# Patient Record
Sex: MALE | Race: WHITE | NOT HISPANIC OR LATINO | Employment: OTHER | URBAN - METROPOLITAN AREA
[De-identification: names, ages, dates, MRNs, and addresses within clinical notes are randomized per-mention and may not be internally consistent; named-entity substitution may affect disease eponyms.]

---

## 2022-11-17 ENCOUNTER — TRANSFERRED RECORDS (OUTPATIENT)
Dept: HEALTH INFORMATION MANAGEMENT | Facility: CLINIC | Age: 77
End: 2022-11-17

## 2023-05-15 ENCOUNTER — HOSPITAL ENCOUNTER (EMERGENCY)
Facility: CLINIC | Age: 78
Discharge: HOME | End: 2023-05-15
Attending: EMERGENCY MEDICINE | Admitting: EMERGENCY MEDICINE
Payer: MEDICARE

## 2023-05-15 VITALS
OXYGEN SATURATION: 99 % | RESPIRATION RATE: 18 BRPM | DIASTOLIC BLOOD PRESSURE: 97 MMHG | SYSTOLIC BLOOD PRESSURE: 162 MMHG | TEMPERATURE: 98.2 F | HEART RATE: 62 BPM

## 2023-05-15 DIAGNOSIS — I10 ESSENTIAL HYPERTENSION: ICD-10-CM

## 2023-05-15 LAB
ANION GAP SERPL CALCULATED.3IONS-SCNC: 8 MMOL/L (ref 7–15)
BASOPHILS # BLD AUTO: 0.1 10E3/UL (ref 0–0.2)
BASOPHILS NFR BLD AUTO: 2 %
BUN SERPL-MCNC: 17.6 MG/DL (ref 8–23)
CALCIUM SERPL-MCNC: 10.1 MG/DL (ref 8.8–10.2)
CHLORIDE SERPL-SCNC: 107 MMOL/L (ref 98–107)
CREAT SERPL-MCNC: 1.06 MG/DL (ref 0.67–1.17)
DEPRECATED HCO3 PLAS-SCNC: 28 MMOL/L (ref 22–29)
EOSINOPHIL # BLD AUTO: 0.3 10E3/UL (ref 0–0.7)
EOSINOPHIL NFR BLD AUTO: 4 %
ERYTHROCYTE [DISTWIDTH] IN BLOOD BY AUTOMATED COUNT: 13.3 % (ref 10–15)
GFR SERPL CREATININE-BSD FRML MDRD: 73 ML/MIN/1.73M2
GLUCOSE SERPL-MCNC: 90 MG/DL (ref 70–99)
HCT VFR BLD AUTO: 46.7 % (ref 40–53)
HGB BLD-MCNC: 15.4 G/DL (ref 13.3–17.7)
HOLD SPECIMEN: NORMAL
HOLD SPECIMEN: NORMAL
IMM GRANULOCYTES # BLD: 0 10E3/UL
IMM GRANULOCYTES NFR BLD: 0 %
LYMPHOCYTES # BLD AUTO: 2.8 10E3/UL (ref 0.8–5.3)
LYMPHOCYTES NFR BLD AUTO: 42 %
MCH RBC QN AUTO: 31.8 PG (ref 26.5–33)
MCHC RBC AUTO-ENTMCNC: 33 G/DL (ref 31.5–36.5)
MCV RBC AUTO: 96 FL (ref 78–100)
MONOCYTES # BLD AUTO: 0.7 10E3/UL (ref 0–1.3)
MONOCYTES NFR BLD AUTO: 10 %
NEUTROPHILS # BLD AUTO: 2.8 10E3/UL (ref 1.6–8.3)
NEUTROPHILS NFR BLD AUTO: 42 %
NRBC # BLD AUTO: 0 10E3/UL
NRBC BLD AUTO-RTO: 0 /100
PLATELET # BLD AUTO: 260 10E3/UL (ref 150–450)
POTASSIUM SERPL-SCNC: 4.7 MMOL/L (ref 3.4–5.3)
RBC # BLD AUTO: 4.85 10E6/UL (ref 4.4–5.9)
SODIUM SERPL-SCNC: 143 MMOL/L (ref 136–145)
TROPONIN T SERPL HS-MCNC: 15 NG/L
WBC # BLD AUTO: 6.6 10E3/UL (ref 4–11)

## 2023-05-15 PROCEDURE — 99284 EMERGENCY DEPT VISIT MOD MDM: CPT

## 2023-05-15 PROCEDURE — 36415 COLL VENOUS BLD VENIPUNCTURE: CPT | Performed by: EMERGENCY MEDICINE

## 2023-05-15 PROCEDURE — 85025 COMPLETE CBC W/AUTO DIFF WBC: CPT | Performed by: EMERGENCY MEDICINE

## 2023-05-15 PROCEDURE — 84484 ASSAY OF TROPONIN QUANT: CPT | Performed by: EMERGENCY MEDICINE

## 2023-05-15 PROCEDURE — 80048 BASIC METABOLIC PNL TOTAL CA: CPT | Performed by: EMERGENCY MEDICINE

## 2023-05-15 PROCEDURE — 93005 ELECTROCARDIOGRAM TRACING: CPT

## 2023-05-15 PROCEDURE — 250N000013 HC RX MED GY IP 250 OP 250 PS 637: Performed by: EMERGENCY MEDICINE

## 2023-05-15 RX ORDER — LISINOPRIL/HYDROCHLOROTHIAZIDE 10-12.5 MG
1 TABLET ORAL DAILY
Qty: 30 TABLET | Refills: 0 | Status: SHIPPED | OUTPATIENT
Start: 2023-05-15 | End: 2023-06-14

## 2023-05-15 RX ORDER — LISINOPRIL/HYDROCHLOROTHIAZIDE 10-12.5 MG
1 TABLET ORAL ONCE
Status: COMPLETED | OUTPATIENT
Start: 2023-05-15 | End: 2023-05-15

## 2023-05-15 RX ORDER — LISINOPRIL/HYDROCHLOROTHIAZIDE 10-12.5 MG
1 TABLET ORAL DAILY
Status: DISCONTINUED | OUTPATIENT
Start: 2023-05-15 | End: 2023-05-15

## 2023-05-15 RX ADMIN — LISINOPRIL AND HYDROCHLOROTHIAZIDE 1 TABLET: 12.5; 1 TABLET ORAL at 17:51

## 2023-05-15 ASSESSMENT — ENCOUNTER SYMPTOMS
BACK PAIN: 0
PALPITATIONS: 0
WEAKNESS: 0
SHORTNESS OF BREATH: 0
LIGHT-HEADEDNESS: 1
CHILLS: 0
NAUSEA: 0
FEVER: 0
HEADACHES: 0
VOMITING: 0

## 2023-05-15 ASSESSMENT — ACTIVITIES OF DAILY LIVING (ADL): ADLS_ACUITY_SCORE: 35

## 2023-05-15 NOTE — ED PROVIDER NOTES
History     Chief Complaint:  Hypertension       The history is provided by the patient.      Kt Tillman is a 77 year old male with a history of hyperlipidemia who presents with hypertension, light-headedness. The patient states that over the last 3 weeks he noticed his blood pressure increasing, went to urgent care today and it was 197/104, here in ED it was 204/117. He complains of some accompanied intermittent light-headedness. He denies any recent medications changes, history of hypertension. He mentions that he has been taking 1 Ibuprofen every night for a while to decrease the swelling in his ankles. He denies any chest pain, headache, shortness of breath, nausea, vomiting, fever, chills, back pain, weakness, palpitations.    Independent Historian:   None - Patient Only    Review of External Notes: See MDM     ROS:  Review of Systems   Constitutional: Negative for chills and fever.   Respiratory: Negative for shortness of breath.    Cardiovascular: Negative for chest pain and palpitations.   Gastrointestinal: Negative for nausea and vomiting.   Musculoskeletal: Negative for back pain.   Neurological: Positive for light-headedness. Negative for weakness and headaches.   All other systems reviewed and are negative.      Allergies:  Ciprofloxacin     Medications:    Finasteride   Omeprazole   Lipitor   Zocor     Past Medical History:    GERD  High cholesterol   Thoracic aortic aneurysm   Sena's esophagus   Hyperlipidemia     Past Surgical History:    Spleenectomy    Cholecystectomy   EGD  Bilateral knee scoped      Family History:    Liver caner    Social History:   reports that he has never smoked. He does not have any smokeless tobacco history on file. He reports current alcohol use. He reports that he does not use drugs.  PCP: Vickey Parker     Physical Exam     Patient Vitals for the past 24 hrs:   BP Temp Temp src Pulse Resp SpO2   05/15/23 1423 (!) 204/117 98.2  F (36.8  C) Temporal 62 20 99  %        Physical Exam  Constitutional: Well developed, nontox appearance  Head: Atraumatic.   Mouth/Throat: Oropharynx is clear and moist.   Neck:  no stridor  Eyes: no scleral icterus  Cardiovascular: RRR, 2+ bilat radial pulses  Pulmonary/Chest: nml resp effort, Clear BS bilat  Abdominal: ND, soft, NT, no rebound or guarding   Ext: Warm, well perfused, trace bilateral lower extremity edema  Neurological: A&O, symmetric facies, moves ext x4  Skin: Skin is warm and dry.   Psychiatric: Behavior is normal. Thought content normal.   Nursing note and vitals reviewed.       Emergency Department Course     ECG results from 05/15/23   EKG 12 lead     Value    Systolic Blood Pressure     Diastolic Blood Pressure     Ventricular Rate 55    Atrial Rate 55    CA Interval 244    QRS Duration 108        QTc 419    P Axis 33    R AXIS -23    T Axis 10    Interpretation ECG      Sinus bradycardia with sinus arrhythmia with 1st degree A-V block  Minimal voltage criteria for LVH, may be normal variant  Cannot rule out Inferior infarct , age undetermined  Abnormal ECG  When compared with ECG of 13-MAY-2011 10:56,  CA interval has increased  Vent. rate has decreased BY  32 BPM  T wave amplitude has increased in Anterior leads       Laboratory:  Labs Ordered and Resulted from Time of ED Arrival to Time of ED Departure   BASIC METABOLIC PANEL - Normal       Result Value    Sodium 143      Potassium 4.7      Chloride 107      Carbon Dioxide (CO2) 28      Anion Gap 8      Urea Nitrogen 17.6      Creatinine 1.06      Calcium 10.1      Glucose 90      GFR Estimate 73     TROPONIN T, HIGH SENSITIVITY - Normal    Troponin T, High Sensitivity 15     CBC WITH PLATELETS AND DIFFERENTIAL    WBC Count 6.6      RBC Count 4.85      Hemoglobin 15.4      Hematocrit 46.7      MCV 96      MCH 31.8      MCHC 33.0      RDW 13.3      Platelet Count 260      % Neutrophils 42      % Lymphocytes 42      % Monocytes 10      % Eosinophils 4      %  Basophils 2      % Immature Granulocytes 0      NRBCs per 100 WBC 0      Absolute Neutrophils 2.8      Absolute Lymphocytes 2.8      Absolute Monocytes 0.7      Absolute Eosinophils 0.3      Absolute Basophils 0.1      Absolute Immature Granulocytes 0.0      Absolute NRBCs 0.0        Emergency Department Course & Assessments:       Interventions:  Medications   lisinopril-hydrochlorothiazide (ZESTORETIC) 10-12.5 MG per tablet 1 tablet (has no administration in time range)        Assessments:   I consulted with the patient and obtained history as shown above     Independent Interpretation (X-rays, CTs, rhythm strip):  See MDM    Consultations/Discussion of Management or Tests:  None     Social Determinants of Health affecting care:   See MDM    Disposition:  The patient was discharged to home.     Impression & Plan    CMS Diagnoses: None    Medical Decision Makin year old male presenting w/ hypertension    Social determinants affecting patient's health include:  Age potentially increasing risk for recurrent ED visits, mild alcohol use otherwise no significant social determinants negatively affecting the patient's health     I reviewed medical records from  urgent care office visit from the [facility] on 5/15/2023    DDx includes essential hypertension, hypertension NOS, electrolyte abnormality.  Doubt dissection, CVA given history and physical exam.  Labs significant for no remarkable abnormality.  Given the patient's exam is reassuring, do not feel emergent imaging is indicated at [facility].  I reviewed his most recent imaging noting mild ascending and descending aortic aneurysms with no evidence of dissection.  He has no symptoms of dissection as well and denies chest pain, shortness of breath, abdominal pain, new or acute back pain.  I think would be reasonable to start the patient on antihypertensive as prescribed as noted below with recommendations to follow-up with PCP and to monitor blood  pressures at home recording the results to go over with his primary care physician.  At this time I feel the pt is safe for discharge.  Recommendations given regarding follow up with PCP and return to the emergency department as needed for new or worsening symptoms.  Pt and wife counseled on all results, disposition and diagnosis.  They are understanding and agreeable to plan. Patient discharged in stable condition.      Diagnosis:    ICD-10-CM    1. Essential hypertension  I10            Discharge Medications:  New Prescriptions    LISINOPRIL-HYDROCHLOROTHIAZIDE (ZESTORETIC) 10-12.5 MG TABLET    Take 1 tablet by mouth daily for 30 days      Scribe Disclosure:  I, Antoine Swan, am serving as a scribe at 4:51 PM on 5/15/2023 to document services personally performed by Joey Galo MD based on my observations and the provider's statements to me.     5/15/2023   Joey Galo MD Vaughn, Christopher E, MD  05/15/23 1646

## 2023-05-15 NOTE — ED TRIAGE NOTES
Patient having dizzy spells x3 weeks ago. Did a home blood pressure and noted it was higher than normal. Went to  and it was 197/103. No hx of hypertension. Hx of descending aortic aneurysm

## 2023-05-15 NOTE — DISCHARGE INSTRUCTIONS
Discharge Instructions  Hypertension - High Blood Pressure    During you visit to the Emergency Department, your blood pressure was higher than the recommended blood pressure.  This may be related to stress, pain, medication or other temporary conditions. In these cases, your blood pressure may return to normal on its own. If you have a history of high blood pressure, you may need to have your provider adjust your medications. Sometimes, your high measurement here may indicate that you have developed high blood pressure that will stay high unless it is treated. As a general rule, high blood pressure causes problems over years rather than days, weeks, or months. So, while it is important to treat blood pressure, it is rarely important to treat blood pressure immediately. Occasionally we will begin a medication in the Emergency Department; more often we will recommend close follow-up for medications with a primary doctor/clinic.    Generally, every Emergency Department visit should have a follow-up clinic visit with either a primary or a specialty clinic/provider. Please follow-up as instructed by your emergency provider today.    Return to the Emergency Department if you start to have:  A severe headache.  Chest pain.  Shortness of breath.  Weakness or numbness that affects one part of the body.  Confusion.  Vision changes.  Significant swelling of legs and/or eyes.  A reaction to any medication started in the Emergency Department.    What can I do to help myself?  Avoid alcohol.  Take any blood pressure medicine that you are prescribed.  Get a good night s sleep.  Lower your salt intake.  Exercise.  Lose weight.  Manage stress.  See your doctor regularly    If blood pressure medication was started in the Emergency Department:  The medicine may not have an immediate effect. The body and brain determine what blood pressure you have. The medicine s job is to retrain the body s  thermostat  to a lower blood  pressure.  You will need to follow up with your provider to see how this medicine is working for you.  If you were given a prescription for medicine here today, be sure to read all of the information (including the package insert) that comes with your prescription.  This will include important information about the medicine, its side effects, and any warnings that you need to know about.  The pharmacist who fills the prescription can provide more information and answer questions you may have about the medicine.  If you have questions or concerns that the pharmacist cannot address, please call or return to the Emergency Department.   Remember that you can always come back to the Emergency Department if you are not able to see your regular provider in the amount of time listed above, if you get any new symptoms, or if there is anything that worries you.

## 2023-05-16 LAB
ATRIAL RATE - MUSE: 55 BPM
DIASTOLIC BLOOD PRESSURE - MUSE: NORMAL MMHG
INTERPRETATION ECG - MUSE: NORMAL
P AXIS - MUSE: 33 DEGREES
PR INTERVAL - MUSE: 244 MS
QRS DURATION - MUSE: 108 MS
QT - MUSE: 438 MS
QTC - MUSE: 419 MS
R AXIS - MUSE: -23 DEGREES
SYSTOLIC BLOOD PRESSURE - MUSE: NORMAL MMHG
T AXIS - MUSE: 10 DEGREES
VENTRICULAR RATE- MUSE: 55 BPM

## 2024-08-22 ENCOUNTER — APPOINTMENT (OUTPATIENT)
Dept: ULTRASOUND IMAGING | Facility: CLINIC | Age: 79
End: 2024-08-22
Payer: MEDICARE

## 2024-08-22 ENCOUNTER — HOSPITAL ENCOUNTER (EMERGENCY)
Facility: CLINIC | Age: 79
Discharge: HOME | End: 2024-08-22
Attending: EMERGENCY MEDICINE | Admitting: EMERGENCY MEDICINE
Payer: MEDICARE

## 2024-08-22 ENCOUNTER — APPOINTMENT (OUTPATIENT)
Dept: CT IMAGING | Facility: CLINIC | Age: 79
End: 2024-08-22
Payer: MEDICARE

## 2024-08-22 VITALS
HEIGHT: 73 IN | HEART RATE: 84 BPM | BODY MASS INDEX: 28.23 KG/M2 | OXYGEN SATURATION: 95 % | WEIGHT: 213 LBS | RESPIRATION RATE: 15 BRPM | TEMPERATURE: 99 F | DIASTOLIC BLOOD PRESSURE: 82 MMHG | SYSTOLIC BLOOD PRESSURE: 136 MMHG

## 2024-08-22 DIAGNOSIS — R55 NEAR SYNCOPE: ICD-10-CM

## 2024-08-22 DIAGNOSIS — Z98.890 POSTOPERATIVE STATE: ICD-10-CM

## 2024-08-22 LAB
ANION GAP SERPL CALCULATED.3IONS-SCNC: 10 MMOL/L (ref 7–15)
ATRIAL RATE - MUSE: 89 BPM
BASOPHILS # BLD AUTO: 0.1 10E3/UL (ref 0–0.2)
BASOPHILS NFR BLD AUTO: 1 %
BUN SERPL-MCNC: 17.5 MG/DL (ref 8–23)
CALCIUM SERPL-MCNC: 9 MG/DL (ref 8.8–10.4)
CHLORIDE SERPL-SCNC: 106 MMOL/L (ref 98–107)
CREAT SERPL-MCNC: 1.18 MG/DL (ref 0.67–1.17)
DIASTOLIC BLOOD PRESSURE - MUSE: NORMAL MMHG
EGFRCR SERPLBLD CKD-EPI 2021: 63 ML/MIN/1.73M2
EOSINOPHIL # BLD AUTO: 0.3 10E3/UL (ref 0–0.7)
EOSINOPHIL NFR BLD AUTO: 3 %
ERYTHROCYTE [DISTWIDTH] IN BLOOD BY AUTOMATED COUNT: 14.1 % (ref 10–15)
GLUCOSE SERPL-MCNC: 103 MG/DL (ref 70–99)
HCO3 SERPL-SCNC: 24 MMOL/L (ref 22–29)
HCT VFR BLD AUTO: 35.3 % (ref 40–53)
HGB BLD-MCNC: 11.7 G/DL (ref 13.3–17.7)
HOLD SPECIMEN: NORMAL
IMM GRANULOCYTES # BLD: 0 10E3/UL
IMM GRANULOCYTES NFR BLD: 0 %
INTERPRETATION ECG - MUSE: NORMAL
LYMPHOCYTES # BLD AUTO: 1.9 10E3/UL (ref 0.8–5.3)
LYMPHOCYTES NFR BLD AUTO: 20 %
MCH RBC QN AUTO: 31.3 PG (ref 26.5–33)
MCHC RBC AUTO-ENTMCNC: 33.1 G/DL (ref 31.5–36.5)
MCV RBC AUTO: 94 FL (ref 78–100)
MONOCYTES # BLD AUTO: 1.3 10E3/UL (ref 0–1.3)
MONOCYTES NFR BLD AUTO: 13 %
NEUTROPHILS # BLD AUTO: 6 10E3/UL (ref 1.6–8.3)
NEUTROPHILS NFR BLD AUTO: 62 %
NRBC # BLD AUTO: 0 10E3/UL
NRBC BLD AUTO-RTO: 0 /100
P AXIS - MUSE: 34 DEGREES
PLATELET # BLD AUTO: 209 10E3/UL (ref 150–450)
POTASSIUM SERPL-SCNC: 3.9 MMOL/L (ref 3.4–5.3)
PR INTERVAL - MUSE: 180 MS
QRS DURATION - MUSE: 98 MS
QT - MUSE: 370 MS
QTC - MUSE: 450 MS
R AXIS - MUSE: -12 DEGREES
RBC # BLD AUTO: 3.74 10E6/UL (ref 4.4–5.9)
SODIUM SERPL-SCNC: 140 MMOL/L (ref 135–145)
SYSTOLIC BLOOD PRESSURE - MUSE: NORMAL MMHG
T AXIS - MUSE: 85 DEGREES
VENTRICULAR RATE- MUSE: 89 BPM
WBC # BLD AUTO: 9.7 10E3/UL (ref 4–11)

## 2024-08-22 PROCEDURE — 36415 COLL VENOUS BLD VENIPUNCTURE: CPT | Performed by: EMERGENCY MEDICINE

## 2024-08-22 PROCEDURE — 82374 ASSAY BLOOD CARBON DIOXIDE: CPT

## 2024-08-22 PROCEDURE — 71275 CT ANGIOGRAPHY CHEST: CPT | Mod: MF

## 2024-08-22 PROCEDURE — 99285 EMERGENCY DEPT VISIT HI MDM: CPT | Mod: 25

## 2024-08-22 PROCEDURE — 250N000011 HC RX IP 250 OP 636

## 2024-08-22 PROCEDURE — 250N000009 HC RX 250

## 2024-08-22 PROCEDURE — 250N000013 HC RX MED GY IP 250 OP 250 PS 637

## 2024-08-22 PROCEDURE — 85025 COMPLETE CBC W/AUTO DIFF WBC: CPT

## 2024-08-22 PROCEDURE — 93005 ELECTROCARDIOGRAM TRACING: CPT

## 2024-08-22 PROCEDURE — 93971 EXTREMITY STUDY: CPT | Mod: LT

## 2024-08-22 RX ORDER — ACETAMINOPHEN 325 MG/1
975 TABLET ORAL ONCE
Status: COMPLETED | OUTPATIENT
Start: 2024-08-22 | End: 2024-08-22

## 2024-08-22 RX ORDER — IOPAMIDOL 755 MG/ML
70 INJECTION, SOLUTION INTRAVASCULAR ONCE
Status: COMPLETED | OUTPATIENT
Start: 2024-08-22 | End: 2024-08-22

## 2024-08-22 RX ADMIN — SODIUM CHLORIDE 88 ML: 9 INJECTION, SOLUTION INTRAVENOUS at 10:07

## 2024-08-22 RX ADMIN — IOPAMIDOL 70 ML: 755 INJECTION, SOLUTION INTRAVENOUS at 10:07

## 2024-08-22 RX ADMIN — ACETAMINOPHEN 975 MG: 325 TABLET, FILM COATED ORAL at 09:23

## 2024-08-22 ASSESSMENT — COLUMBIA-SUICIDE SEVERITY RATING SCALE - C-SSRS
6. HAVE YOU EVER DONE ANYTHING, STARTED TO DO ANYTHING, OR PREPARED TO DO ANYTHING TO END YOUR LIFE?: NO
1. IN THE PAST MONTH, HAVE YOU WISHED YOU WERE DEAD OR WISHED YOU COULD GO TO SLEEP AND NOT WAKE UP?: NO
2. HAVE YOU ACTUALLY HAD ANY THOUGHTS OF KILLING YOURSELF IN THE PAST MONTH?: NO

## 2024-08-22 ASSESSMENT — ACTIVITIES OF DAILY LIVING (ADL)
ADLS_ACUITY_SCORE: 35

## 2024-08-22 NOTE — ED TRIAGE NOTES
Pt experienced a syncopal episode this am, wife was present, pt did not fall, no injury, BP at ems arrival was 145/80's. EKG unremarkable. Blood sugar was 111.    Patient does not recall event. Patient had total L knee- arthroplasty  2 days ago. Pain is well controlled with over counter tylenol. CMS intact on arrival.     Orthostatics layin/84, heart rate 84. Sitting 112/91, heart rate 104. Standing 124/84, heart rate 109.      Triage Assessment (Adult)       Row Name 24 [id number]          Triage Assessment    Airway WDL WDL        Respiratory WDL    Respiratory WDL WDL        Skin Circulation/Temperature WDL    Skin Circulation/Temperature WDL WDL        Cardiac WDL    Cardiac WDL WDL        Peripheral/Neurovascular WDL    Peripheral Neurovascular WDL WDL        Cognitive/Neuro/Behavioral WDL    Cognitive/Neuro/Behavioral WDL WDL

## 2024-08-22 NOTE — DISCHARGE INSTRUCTIONS
There was no sign of blood clots in your legs or lung. Electrolytes are normal. Hemoglobin (blood level) is stable. Please continue taking tylenol for your post op pain. Continue pushing fluids.    If you develop chest pain, shortness of breath, palpations, syncope please return to the ED

## 2024-08-22 NOTE — ED PROVIDER NOTES
Emergency Department Note      History of Present Illness     Chief Complaint   Syncope      HPI   Kt Tillman is a 79 year old male with history of hyperlipidemia and descending aneurism who presents after a syncope. The Patient reports of a total knee surgery 2 days ago, he just got home yesterday at 1200. He reports no complications with the surgery. Last night he reports needing to use the restroom every 2 hours, typically every 3-4 hours, he has been pushing fluids. This morning when he was leaving the restroom he had sudden onset of weakness and dizziness. He denies room spinning. He reports he did not fully black out but woke up on the floor. He denies fall or injuries as his wife helped him down to the floor. He has never had a syncope before. He reports feeling fine afterwards and feels fine now. He denies chest pain, shortness of breath, headache, fever, chills, back pain. He reports knee pain at rest is 2-3 and is 8-9 when he is walking. He took tylenol last night. He ate a meal yesterday around noon but did not eat much last night.     Independent Historian   None    Review of External Notes   8/20/24 discharge summary: Status post left total knee replacement.  Recommended follow-up with orthopedics in 1 week.    Past Medical History     Medical History and Problem List   GERD  Hld  Aneurysm descending  Sena's esophagus     Medications   Aspirin  Lipitor  Celebrex  Periostat  Vistaril  Cozaar  Zofran  Roxicodone  Protonix  Zocor  Omeprazole  Zestoretic     Surgical History   Spleenectomy  Cholecystectomy  Colonoscopy  Esophagoscopy, gastroscopy, duodenoscopy combined  Fragmenting of kidney stone  Total knee surgery (L)      Physical Exam     Patient Vitals for the past 24 hrs:   BP Temp Temp src Pulse Resp SpO2 Height Weight   08/22/24 1106 136/82 -- -- -- -- 95 % -- --   08/22/24 0951 139/77 -- -- 84 15 93 % -- --   08/22/24 0936 (!) 140/79 -- -- 85 10 94 % -- --   08/22/24 0920 133/76 -- -- 87  "11 94 % -- --   08/22/24 0905 127/78 -- -- 86 13 96 % -- --   08/22/24 0850 (!) 142/86 -- -- 92 -- 97 % -- --   08/22/24 0835 124/84 -- -- 111 21 96 % -- --   08/22/24 0827 136/81 99  F (37.2  C) Oral 95 18 95 % 1.854 m (6' 1\") 96.6 kg (213 lb)     Physical Exam  General: Awake, alert, non-toxic.  Head:  Scalp is atraumatic.   Eyes:  Conjunctiva normal, PERRL  ENT:  The external nose and ears are normal.     Oropharynx clear, uvula midline.  Neck:  Normal range of motion without rigidity.  CV:  Regular rate and rhythm    No pathologic murmur, rubs, or gallops.  Resp:  Breath sounds are clear bilaterally    Non-labored, no retractions or accessory muscle use  Abdomen: Abdomen is soft, no distension, no tenderness, no masses. No CVA tenderness.  MS:  Left lower extremity swelling.  Mild bruising around the medial aspect of the left calf.  Mild tenderness to palpation.  Skin:  Left knee surgical scars are clear dry intact.  No drainage.  Clear postsurgical dressing in place.  Neuro:  Alert and oriented.  GCS 15 Moves all extremities normal.  No facial asymmetry. Gait normal.  Psych: Awake. Alert. Normal affect. Appropriate interactions.      Diagnostics     Lab Results   Labs Ordered and Resulted from Time of ED Arrival to Time of ED Departure   BASIC METABOLIC PANEL - Abnormal       Result Value    Sodium 140      Potassium 3.9      Chloride 106      Carbon Dioxide (CO2) 24      Anion Gap 10      Urea Nitrogen 17.5      Creatinine 1.18 (*)     GFR Estimate 63      Calcium 9.0      Glucose 103 (*)    CBC WITH PLATELETS AND DIFFERENTIAL - Abnormal    WBC Count 9.7      RBC Count 3.74 (*)     Hemoglobin 11.7 (*)     Hematocrit 35.3 (*)     MCV 94      MCH 31.3      MCHC 33.1      RDW 14.1      Platelet Count 209      % Neutrophils 62      % Lymphocytes 20      % Monocytes 13      % Eosinophils 3      % Basophils 1      % Immature Granulocytes 0      NRBCs per 100 WBC 0      Absolute Neutrophils 6.0      Absolute " Lymphocytes 1.9      Absolute Monocytes 1.3      Absolute Eosinophils 0.3      Absolute Basophils 0.1      Absolute Immature Granulocytes 0.0      Absolute NRBCs 0.0         Imaging   US Lower Extremity Venous Duplex Left   Final Result   IMPRESSION: No evidence of deep venous thrombosis.  Baker's cyst.      BARBARA PAINTING MD            SYSTEM ID:  [id number]      CT Chest Pulmonary Embolism w Contrast   Preliminary Result   IMPRESSION:   1.  No acute abnormality in the chest. No evidence for pulmonary   embolism.   2.  Dilatation of the ascending thoracic aorta measures 4.2 cm.   3.  Moderate hiatal hernia.          EKG   ECG results from 08/22/24   EKG 12 lead     Value    Systolic Blood Pressure     Diastolic Blood Pressure     Ventricular Rate 89    Atrial Rate 89    VA Interval 180    QRS Duration 98        QTc 450    P Axis 34    R AXIS -12    T Axis 85    Interpretation ECG      Sinus rhythm  Normal ECG  When compared with ECG of 15-May-2023 15:18,  VA interval has decreased  Vent. rate has increased by  34 bpm  Nonspecific T wave abnormality now evident in Lateral leads  Read by Dr. Thibodeaux       Independent Interpretation   None    ED Course      Medications Administered   Medications   acetaminophen (TYLENOL) tablet 975 mg (975 mg Oral $Given 8/22/24 0923)   iopamidol (ISOVUE-370) solution 70 mL (70 mLs Intravenous $Given 8/22/24 1007)   sodium chloride 0.9 % bag 500mL for CT scan flush use (88 mLs Intravenous $Given 8/22/24 1007)       Procedures   Procedures     Discussion of Management   None    ED Course   ED Course as of 08/22/24 1207   u Aug 22, 2024   0852 Dr. Thibodeaux discussed the case with Pricila Myrick PA-C. Discussed presentation, exam, ddx, plan.   0900 Dr. Thibodeaux' evaluation.        Additional Documentation  None    Medical Decision Making / Diagnosis     CMS Diagnoses: None    MIPS   CT for PE was ordered because the patient is high risk for pulmonary embolism.    ERIK PHILLIPS  Primo is a 79 year old male who presented to the ED for evaluation of near syncope in the setting of TKA preformed 2 days prior. On exam the patient is hemodynamically stable. Left knee incision clear, dry, intact without evidence of drainage or erythema to suggest infection. Patient asymptomatic at the time of syncope. However due to the patient's postoperative state and tachycardia in the setting of syncope PE considered. D-dimer ultimately was not obtained as it was expected to be elevated in the setting of recent surgery. Therefore CT PE study obtained which showed no evidence of PE. Left leg US showed no evidence of DVT.  Hemoglobin stable.  Electrolytes within normal limits.  No evidence of leukocytosis.  EKG in normal sinus rhythm, no evidence of ST/T wave changes or arrhythmia.  During the patient's stay here he feels at baseline.  Suspect a syncope could be contributed due to postoperative pain in the setting of ambulation.  Patient's pain is well-controlled here and at home with acetaminophen.  These findings were discussed with the patient and wife who expressed understanding.  Return precautions discussed.  Patient discharged home in stable condition.    Disposition   The patient was discharged.     Diagnosis     ICD-10-CM    1. Near syncope  R55       2. Postoperative state  Z98.890            Scribe Disclosure:  I, Kyrie Raphael, am serving as a scribe at 8:59 AM on 8/22/2024 to document services personally performed by Lotus Alonzo PA-C based on my observations and the provider's statements to me.        Lotus Alonzo PA-C  08/22/24 [id number]